# Patient Record
(demographics unavailable — no encounter records)

---

## 2024-11-14 NOTE — HISTORY OF PRESENT ILLNESS
[de-identified] : Today I had the pleasure of seeing DHEERAJ for post op evaluation.      DHEERAJ is now s/p Bilateral myringotomy with tubes, adenoidectomy 10/14/24 [de-identified] : sleeping well, congestion resolved, hearing improved

## 2024-11-14 NOTE — CONSULT LETTER
[Dear  ___] : Dear  [unfilled], [Courtesy Letter:] : I had the pleasure of seeing your patient, [unfilled], in my office today. [Please see my note below.] : Please see my note below. [Consult Closing:] : Thank you very much for allowing me to participate in the care of this patient.  If you have any questions, please do not hesitate to contact me. [Sincerely,] : Sincerely, [FreeTextEntry2] : Dr. Denis Hernandes  [address], [place] [address] [phone] [FreeTextEntry3] : Misti Sawyer MD Pediatric Otolaryngology / Head and Neck Surgery    [facility] [address] [place] [address] Tel [phone] Fax [phone]    0 [facility], [facility] [address] [place] [address]  Tel [phone] Fax [phone]

## 2024-11-14 NOTE — ASSESSMENT
[FreeTextEntry1] : DHEERAJ is a 6 year old boy presenting for sleep disordered breathing, abnormal auditory perception s/p bilateral myringotomy with tube placement adenoidectomy 10/14/24  Eustachian Tube Dysfunction s/p BMT  - audiogram 11/14/24 - expectant management, monitor PET q6months until extrusion  - history of CT possible left cholesteatoma reviewed unlikely cholesteatoma  sleep disordered breathing resolved

## 2024-11-14 NOTE — PHYSICAL EXAM
[Placement/Patency] : tympanostomy tube in place and patent [Clear/Ventilated] : middle ear clear and well ventilated [Exposed Vessel] : left anterior vessel not exposed [2+] : 2+ [Increased Work of Breathing] : no increased work of breathing with use of accessory muscles and retractions [Normal Gait and Station] : normal gait and station [Normal muscle strength, symmetry and tone of facial, head and neck musculature] : normal muscle strength, symmetry and tone of facial, head and neck musculature [Normal] : no cervical lymphadenopathy [FreeTextEntry7] : preauricular pit [de-identified] : L 2+ R 1+ [de-identified] : rapid movements, heel to shin, finger to nose, fakuda, head shake all normal

## 2025-05-08 NOTE — HISTORY OF PRESENT ILLNESS
[de-identified] : Today I had the pleasure of seeing DHEERAJ MOE for follow up.  History was obtained from patient, mother and chart.  s/p Bilateral myringotomy with tubes, adenoidectomy 10/14/24.    [de-identified] : doing well, no drainage or ear infection, no congestion/snoring

## 2025-05-08 NOTE — CONSULT LETTER
[Dear  ___] : Dear  [unfilled], [Courtesy Letter:] : I had the pleasure of seeing your patient, [unfilled], in my office today. [Please see my note below.] : Please see my note below. [Consult Closing:] : Thank you very much for allowing me to participate in the care of this patient.  If you have any questions, please do not hesitate to contact me. [Sincerely,] : Sincerely, [FreeTextEntry2] : Dr. Denis Hernandes [address] [facility] [address], [place] [address]  [phone] [FreeTextEntry3] : Misti Sawyer MD Pediatric Otolaryngology / Head and Neck Surgery    [facility] [address] [place] [address] Tel [phone] Fax [phone]    2 [facility], [facility] [address] [place] [address]  Tel [phone] Fax [phone]

## 2025-05-08 NOTE — ASSESSMENT
[FreeTextEntry1] : DHEERAJ is a 6 year old boy presenting for sleep disordered breathing, abnormal auditory perception s/p bilateral myringotomy with tube placement adenoidectomy 10/14/24  Eustachian Tube Dysfunction s/p BMT  - audiogram 11/14/24 within normal limits  - expectant management, monitor PET q6months until extrusion  - history of CT possible left cholesteatoma reviewed unlikely cholesteatoma  sleep disordered breathing resolved

## 2025-05-08 NOTE — PHYSICAL EXAM
[Placement/Patency] : tympanostomy tube in place and patent [Clear/Ventilated] : middle ear clear and well ventilated [Exposed Vessel] : left anterior vessel not exposed [2+] : 2+ [Increased Work of Breathing] : no increased work of breathing with use of accessory muscles and retractions [Normal Gait and Station] : normal gait and station [Normal muscle strength, symmetry and tone of facial, head and neck musculature] : normal muscle strength, symmetry and tone of facial, head and neck musculature [Normal] : no cervical lymphadenopathy [FreeTextEntry7] : preauricular pit [de-identified] : mild exudates [de-identified] : rapid movements, heel to shin, finger to nose, fakuda, head shake all normal